# Patient Record
Sex: MALE | Race: WHITE | NOT HISPANIC OR LATINO | URBAN - METROPOLITAN AREA
[De-identification: names, ages, dates, MRNs, and addresses within clinical notes are randomized per-mention and may not be internally consistent; named-entity substitution may affect disease eponyms.]

---

## 2018-01-09 PROCEDURE — 99285 EMERGENCY DEPT VISIT HI MDM: CPT | Mod: 25

## 2018-01-10 ENCOUNTER — OUTPATIENT (OUTPATIENT)
Dept: OUTPATIENT SERVICES | Facility: HOSPITAL | Age: 58
LOS: 1 days | End: 2018-01-10

## 2018-01-10 ENCOUNTER — INPATIENT (INPATIENT)
Facility: HOSPITAL | Age: 58
LOS: 0 days | Discharge: HOME | End: 2018-01-10
Payer: COMMERCIAL

## 2018-05-04 ENCOUNTER — TRANSCRIPTION ENCOUNTER (OUTPATIENT)
Age: 58
End: 2018-05-04

## 2019-02-19 ENCOUNTER — EMERGENCY (EMERGENCY)
Facility: HOSPITAL | Age: 59
LOS: 1 days | End: 2019-02-19
Admitting: EMERGENCY MEDICINE
Payer: COMMERCIAL

## 2019-02-19 PROCEDURE — 93010 ELECTROCARDIOGRAM REPORT: CPT

## 2019-02-19 PROCEDURE — 99285 EMERGENCY DEPT VISIT HI MDM: CPT

## 2019-02-20 ENCOUNTER — RESULT REVIEW (OUTPATIENT)
Age: 59
End: 2019-02-20

## 2019-02-20 ENCOUNTER — INPATIENT (INPATIENT)
Facility: HOSPITAL | Age: 59
LOS: 0 days | Discharge: HOME | DRG: 392 | End: 2019-02-20
Attending: HOSPITALIST | Admitting: STUDENT IN AN ORGANIZED HEALTH CARE EDUCATION/TRAINING PROGRAM
Payer: COMMERCIAL

## 2019-02-20 ENCOUNTER — TRANSCRIPTION ENCOUNTER (OUTPATIENT)
Age: 59
End: 2019-02-20

## 2019-02-20 VITALS
RESPIRATION RATE: 18 BRPM | DIASTOLIC BLOOD PRESSURE: 74 MMHG | OXYGEN SATURATION: 99 % | HEART RATE: 70 BPM | SYSTOLIC BLOOD PRESSURE: 142 MMHG | TEMPERATURE: 98 F

## 2019-02-20 VITALS
WEIGHT: 197.09 LBS | HEIGHT: 69 IN | OXYGEN SATURATION: 98 % | TEMPERATURE: 98 F | DIASTOLIC BLOOD PRESSURE: 79 MMHG | SYSTOLIC BLOOD PRESSURE: 137 MMHG | HEART RATE: 74 BPM | RESPIRATION RATE: 20 BRPM

## 2019-02-20 DIAGNOSIS — T18.108A UNSPECIFIED FOREIGN BODY IN ESOPHAGUS CAUSING OTHER INJURY, INITIAL ENCOUNTER: ICD-10-CM

## 2019-02-20 PROBLEM — Z00.00 ENCOUNTER FOR PREVENTIVE HEALTH EXAMINATION: Status: ACTIVE | Noted: 2019-02-20

## 2019-02-20 LAB
ANION GAP SERPL CALC-SCNC: 9 MMOL/L — SIGNIFICANT CHANGE UP (ref 5–17)
APTT BLD: 27.8 SEC — SIGNIFICANT CHANGE UP (ref 27.5–36.3)
BLD GP AB SCN SERPL QL: SIGNIFICANT CHANGE UP
BUN SERPL-MCNC: 17 MG/DL — SIGNIFICANT CHANGE UP (ref 8–20)
CALCIUM SERPL-MCNC: 7.5 MG/DL — LOW (ref 8.6–10.2)
CHLORIDE SERPL-SCNC: 111 MMOL/L — HIGH (ref 98–107)
CO2 SERPL-SCNC: 21 MMOL/L — LOW (ref 22–29)
CREAT SERPL-MCNC: 0.56 MG/DL — SIGNIFICANT CHANGE UP (ref 0.5–1.3)
GLUCOSE SERPL-MCNC: 90 MG/DL — SIGNIFICANT CHANGE UP (ref 70–115)
HCT VFR BLD CALC: 39.1 % — LOW (ref 42–52)
HGB BLD-MCNC: 13.3 G/DL — LOW (ref 14–18)
INR BLD: 1.03 RATIO — SIGNIFICANT CHANGE UP (ref 0.88–1.16)
MCHC RBC-ENTMCNC: 31.4 PG — HIGH (ref 27–31)
MCHC RBC-ENTMCNC: 34 G/DL — SIGNIFICANT CHANGE UP (ref 32–36)
MCV RBC AUTO: 92.4 FL — SIGNIFICANT CHANGE UP (ref 80–94)
PLATELET # BLD AUTO: 285 K/UL — SIGNIFICANT CHANGE UP (ref 150–400)
POTASSIUM SERPL-MCNC: 4 MMOL/L — SIGNIFICANT CHANGE UP (ref 3.5–5.3)
POTASSIUM SERPL-SCNC: 4 MMOL/L — SIGNIFICANT CHANGE UP (ref 3.5–5.3)
PROTHROM AB SERPL-ACNC: 11.9 SEC — SIGNIFICANT CHANGE UP (ref 10–12.9)
RBC # BLD: 4.23 M/UL — LOW (ref 4.6–6.2)
RBC # FLD: 12.6 % — SIGNIFICANT CHANGE UP (ref 11–15.6)
SODIUM SERPL-SCNC: 141 MMOL/L — SIGNIFICANT CHANGE UP (ref 135–145)
TYPE + AB SCN PNL BLD: SIGNIFICANT CHANGE UP
WBC # BLD: 7.8 K/UL — SIGNIFICANT CHANGE UP (ref 4.8–10.8)
WBC # FLD AUTO: 7.8 K/UL — SIGNIFICANT CHANGE UP (ref 4.8–10.8)

## 2019-02-20 PROCEDURE — 99223 1ST HOSP IP/OBS HIGH 75: CPT

## 2019-02-20 PROCEDURE — 99285 EMERGENCY DEPT VISIT HI MDM: CPT | Mod: 25

## 2019-02-20 PROCEDURE — 85610 PROTHROMBIN TIME: CPT

## 2019-02-20 PROCEDURE — 85027 COMPLETE CBC AUTOMATED: CPT

## 2019-02-20 PROCEDURE — 99285 EMERGENCY DEPT VISIT HI MDM: CPT

## 2019-02-20 PROCEDURE — 99053 MED SERV 10PM-8AM 24 HR FAC: CPT

## 2019-02-20 PROCEDURE — 86850 RBC ANTIBODY SCREEN: CPT

## 2019-02-20 PROCEDURE — 80048 BASIC METABOLIC PNL TOTAL CA: CPT

## 2019-02-20 PROCEDURE — 86901 BLOOD TYPING SEROLOGIC RH(D): CPT

## 2019-02-20 PROCEDURE — 36415 COLL VENOUS BLD VENIPUNCTURE: CPT

## 2019-02-20 PROCEDURE — 88305 TISSUE EXAM BY PATHOLOGIST: CPT | Mod: 26

## 2019-02-20 PROCEDURE — 99223 1ST HOSP IP/OBS HIGH 75: CPT | Mod: 25

## 2019-02-20 PROCEDURE — 85730 THROMBOPLASTIN TIME PARTIAL: CPT

## 2019-02-20 PROCEDURE — C1726: CPT

## 2019-02-20 PROCEDURE — 88305 TISSUE EXAM BY PATHOLOGIST: CPT

## 2019-02-20 PROCEDURE — 12345: CPT | Mod: NC

## 2019-02-20 PROCEDURE — 86900 BLOOD TYPING SEROLOGIC ABO: CPT

## 2019-02-20 RX ORDER — PANTOPRAZOLE SODIUM 20 MG/1
1 TABLET, DELAYED RELEASE ORAL
Qty: 60 | Refills: 0 | OUTPATIENT
Start: 2019-02-20 | End: 2019-03-21

## 2019-02-20 RX ORDER — SODIUM CHLORIDE 9 MG/ML
1000 INJECTION INTRAMUSCULAR; INTRAVENOUS; SUBCUTANEOUS
Qty: 0 | Refills: 0 | Status: DISCONTINUED | OUTPATIENT
Start: 2019-02-20 | End: 2019-02-20

## 2019-02-20 RX ORDER — PANTOPRAZOLE SODIUM 20 MG/1
40 TABLET, DELAYED RELEASE ORAL DAILY
Qty: 0 | Refills: 0 | Status: DISCONTINUED | OUTPATIENT
Start: 2019-02-20 | End: 2019-02-20

## 2019-02-20 RX ADMIN — PANTOPRAZOLE SODIUM 40 MILLIGRAM(S): 20 TABLET, DELAYED RELEASE ORAL at 11:39

## 2019-02-20 NOTE — ED ADULT TRIAGE NOTE - CHIEF COMPLAINT QUOTE
Transfer from [place] for esophageal food impaction. received glucagon x2 and 1 liter NS, currently states he feels better and has been able to swallow secretions for the past 45 mins, no airway compromise at this time

## 2019-02-20 NOTE — ED PROVIDER NOTE - ENMT, MLM
Airway patent, Nasal mucosa clear. Mouth with normal mucosa. Throat has no vesicles, no oropharyngeal exudates and uvula is midline. No drooling or trismus.

## 2019-02-20 NOTE — CONSULT NOTE ADULT - SUBJECTIVE AND OBJECTIVE BOX
Patient is a 58y old  Male who presents with a chief complaint of Food impaction (20 Feb 2019 04:38)      HPI:  59 y/o male with no significant PMH was sent from [facility] to ED for food impaction. Patient said he went to [facility] because he had chest tightness and abdominal discomfort due to food impaction in his esophagus. Patient said he was eating chicken around 6pm yesterday and choked on his food. He was evaluated at [facility], given Glucagon x2 and IV NS and was transferred to [facility] for GI consultation and possible endoscopy. Patient reported similar episode last year for which he had to undergo EGD. He follows GI (Dr. Veras). He currently has no chest pain or abdominal discomfort; no nausea, vomiting, difficultly breathing, drooling, able to speak in full sentence. (20 Feb 2019 04:38). He states that he had a similar episode roughly one year ago and had an EGD @ [facility] then with removal of meat impaction. Never went for f/u EGD but states that he has chronic dysphagia for solids and usually cuts his meat up very well but did not do so last night. He had a negative screening colonoscopy done roughly 5 years ago and does admit to chronic reflux for which he takes no acid suppression medications. He feels as if the piece of chicken is still there but can swallow his own saliva.      REVIEW OF SYSTEMS:  Constitutional: No fever, weight loss or fatigue  ENMT:  No difficulty hearing, tinnitus, vertigo; No sinus or throat pain  Respiratory: No cough, wheezing, chills or hemoptysis  Cardiovascular: No chest pain, palpitations, dizziness or leg swelling  Gastrointestinal: As per HPI.  Skin: No itching, burning, rashes or lesions   Musculoskeletal: No joint pain or swelling; No muscle, back or extremity pain  Patient has no cardiopulmonary, peripheral vascular, musculoskeletal, dermatological, neurological, or psychological symptoms or complaints at this time.    PAST MEDICAL & SURGICAL HISTORY:  GERD (gastroesophageal reflux disease)  No significant past surgical history      FAMILY HISTORY:  No pertinent family history in first degree relatives      SOCIAL HISTORY:  Smoking Status: [ ] Current, [ ] Former, [ x] Never  Pack Years: N/A. No h/o ETOH or drug abuse.    MEDICATIONS: None at home  MEDICATIONS  (STANDING):    MEDICATIONS  (PRN):      Allergies    penicillin (Unknown)    Intolerances        Vital Signs Last 24 Hrs  T(C): 36.7 (20 Feb 2019 04:58), Max: 37 (20 Feb 2019 03:41)  T(F): 98.1 (20 Feb 2019 04:58), Max: 98.6 (20 Feb 2019 03:41)  HR: 62 (20 Feb 2019 05:30) (62 - 80)  BP: 114/67 (20 Feb 2019 05:30) (114/67 - 152/87)  BP(mean): --  RR: 18 (20 Feb 2019 04:58) (18 - 20)  SpO2: 98% (20 Feb 2019 04:58) (98% - 99%)        PHYSICAL EXAM:    General: Well developed; well nourished; in no acute distress  HEENT: MMM, conjunctiva pink and sclera anicteric.  Lungs: Clear bilaterally.  Cor: RRR S1, S2 only.  Gastrointestinal: Abdomen: Soft, non-tender non-distended; Normal bowel sounds; No rebound or guarding or HSM.  NAVEED: Not done. Not indicated in this patient presently.  Extremities: Normal range of motion, No clubbing, cyanosis or edema  Neurological: Alert and oriented x3  Skin: Warm and dry. No obvious rash      LABS:                        13.3   7.8   )-----------( 285      ( 20 Feb 2019 03:21 )             39.1     02-20    141  |  111<H>  |  17.0  ----------------------------<  90  4.0   |  21.0<L>  |  0.56    Ca    7.5<L>      20 Feb 2019 03:21            RADIOLOGY & ADDITIONAL STUDIES:   None to review.

## 2019-02-20 NOTE — H&P ADULT - HISTORY OF PRESENT ILLNESS
59 y/o male with no significant PMH was sent from [facility] to ED for food impaction. Patient said he went to [facility] because he had chest tightness and abdominal discomfort due to food impaction in his esophagus. Patient said he was eating chicken around 6pm yesterday and choked on his food. He was evaluated at [facility], given Glucagon x2 and IV NS and was transferred to [facility] for GI consultation and possible endoscopy. Patient reported similar episode last year for which he had to undergo EGD. He follows GI (Dr. Veras). He currently has no chest pain or abdominal discomfort; no nausea, vomiting, difficultly breathing, drooling, able to speak in full sentence.

## 2019-02-20 NOTE — ED ADULT NURSE NOTE - OBJECTIVE STATEMENT
pt alert and oriented x4 transferred to [facility] from [facility]. pt states he was eating chicken at 1900 and did not cut his chicken into his normal small pieces and felt the chicken get impacted in his throat. pt states this has happened before leading to an endoscopy for removal. respirations even unlabored. pt in no current distress. pt awaiting GI in AM. pt educated on plan of care, pt able to successfully teach back plan of care to RN, RN will continue to reeducate pt during hospital stay.

## 2019-02-20 NOTE — ED PROVIDER NOTE - OBJECTIVE STATEMENT
59 y/o M presents to ED c/o chest tightness and abdominal discomfort secondary to food bolus in the esophagus onset today. States he was eating chicken and immediately felt it get stuck in his throat. Was evaluated at [facility], where he was given Glucagon x2 and IV NS and was transferred to [facility] for GI consultation and possible endoscopy. Currently in the ED, states he feels he can now handle his secretions, the chest pain has subsided and generally feels better. Did not have an x-ray done at [place] because he refused. Notes this is not the first time he has had an impacted food bolus and followed up with his GI after, stating he was placed on medications to help manage his GERD and that "he expanded my throat somehow". Has not yet tried to eat or drink since being at [facility]. Denies fever. Denies HA or neck pain. No cough or sob. No abd pain or n/v/d. No urinary f/u/d. No back pain. No motor or sensory deficits. Denies illicit drug use. No recent travel. No rash. No other acute issues, symptoms or concerns.       GI: Dr. Veras

## 2019-02-20 NOTE — DISCHARGE NOTE ADULT - MEDICATION SUMMARY - MEDICATIONS TO TAKE
I will START or STAY ON the medications listed below when I get home from the hospital:    Protonix 40 mg oral delayed release tablet  -- 1 tab(s) by mouth 2 times a day   -- It is very important that you take or use this exactly as directed.  Do not skip doses or discontinue unless directed by your doctor.  Obtain medical advice before taking any non-prescription drugs as some may affect the action of this medication.  Swallow whole.  Do not crush.    -- Indication: For Esophagitis

## 2019-02-20 NOTE — H&P ADULT - ASSESSMENT
59 y/o male with no significant PMH was sent from [facility] to ED for food impaction.     Food impaction   Admit to monitor bed   S/p Glucagon at [facility]   GI consulted in the ED, will see patient in AM for EGD   Will keep patient NPO     Supportive   DVT prophylaxis: ambulate as tolerated   Diet: NPO; advance diet as tolerated after EGD

## 2019-02-20 NOTE — DISCHARGE NOTE ADULT - CARE PROVIDER_API CALL
YASH,   Phone: (   )    -  Fax: (   )    -  Follow Up Time:     Qamar James)  Gastroenterology; Internal Medicine  [address] [place] [address]  Phone: [phone]  Fax: [phone]  Follow Up Time:

## 2019-02-20 NOTE — ED PROVIDER NOTE - SKIN, MLM
Skin normal color for race, warm, dry and intact. No evidence of rash. No crepitus to chest wall or neck.

## 2019-02-20 NOTE — CONSULT NOTE ADULT - PROBLEM SELECTOR RECOMMENDATION 9
Pt. with meat impaction in esophagus. Keep NPO for EGD later this AM. Pt. is aware and agreeable. EGD with report to follow. If meat impaction successfully cleared he can be discharged home thereafter.

## 2019-02-20 NOTE — ED ADULT NURSE NOTE - NSFALLRSKOUTCOME_ED_ALL_ED
1) Start decadron burst by mouth twice daily x 5 days:  - 2.5mg (5 tabs) in the morning with food  - 2.25mg (4.5 tabs) in the evening with food    2) Continue 6MP (mercaptopurine) 1.5 tabs (75mg) by mouth every day    3) Continue methotrexate 8 tabs (20mg) by mouth once a week on the following Thursdays:  - 1/4/18  - 1/11/18  - 1/18/18  - 1/25/18    4) Return to clinic on 1/9/18 at 12pm to participate in [facility]   Universal Safety Interventions

## 2019-02-20 NOTE — DISCHARGE NOTE ADULT - PATIENT PORTAL LINK FT
You can access the [email or web address] Patient Portal, offered by [facility], by registering with the following website: [email or web address]

## 2019-02-20 NOTE — DISCHARGE NOTE ADULT - PLAN OF CARE
Dilated. Status post endoscopy, dilated. Biopsy obtained.  F/u with GI 1-2 weeks to follow on biopsy result Take medicine as reconciled.   F/u with GI as above.

## 2019-02-20 NOTE — BRIEF OPERATIVE NOTE - OPERATION/FINDINGS
erosive esophahgitis LA grade B  esophageal stricture @ 40 dilated to 11->12->13.5-> 15   stricture biopsied  otherwise nl EGD

## 2019-02-20 NOTE — DISCHARGE NOTE ADULT - CARE PLAN
Principal Discharge DX:	Esophageal stricture  Goal:	Dilated.  Assessment and plan of treatment:	Status post endoscopy, dilated. Biopsy obtained.  F/u with GI 1-2 weeks to follow on biopsy result  Secondary Diagnosis:	Esophagitis  Assessment and plan of treatment:	Take medicine as reconciled.   F/u with GI as above.

## 2019-02-20 NOTE — ED ADULT NURSE NOTE - CHIEF COMPLAINT QUOTE
Transfer from [place] for esophageal food impaction. received glucagon x2 and 1 liter NS, currently states he feels better and has been able to swallow secretions for the past 45 mins, no airway compromise at this time

## 2019-02-20 NOTE — DISCHARGE NOTE ADULT - HOSPITAL COURSE
Pt is 57 y/o male with no significant PMH, not on any medicine at home, was sent to [facility] from [facility] for food impaction after eating chicken. Pt was admitted to medicine in consultation with GI, underwent EGD which showed esophagitis and esophageal stricture, s/p dilatation. Pt clinically improved post EGD and is completely asymptomatic.  Deemed cleared from GI on PPI and outpatient f/u on biopsy result.    PHYSICAL EXAM:    Vital Signs Last 24 Hrs  T(C): 36.4 (20 Feb 2019 11:27), Max: 37 (20 Feb 2019 03:41)  T(F): 97.6 (20 Feb 2019 11:27), Max: 98.6 (20 Feb 2019 03:41)  HR: 70 (20 Feb 2019 11:27) (62 - 80)  BP: 142/74 (20 Feb 2019 11:27) (113/56 - 152/87)  BP(mean): --  RR: 18 (20 Feb 2019 11:27) (18 - 20)  SpO2: 99% (20 Feb 2019 11:27) (98% - 100%)    GENERAL: Pt lying comfortably, NAD.  CHEST/LUNG: Clear to auscultatation bilaterally; No wheezing.  HEART: S1S2+, Regular rate and rhythm; No murmurs.  ABDOMEN: Soft, Nontender, Nondistended; Bowel sounds present.  NEURO: AAOX3, no focal deficits, no motor r sensory loss.  PSYCH: normal mood.

## 2019-02-22 LAB — SURGICAL PATHOLOGY STUDY: SIGNIFICANT CHANGE UP

## 2020-12-31 ENCOUNTER — EMERGENCY (EMERGENCY)
Facility: HOSPITAL | Age: 60
LOS: 1 days | End: 2020-12-31
Admitting: EMERGENCY MEDICINE
Payer: COMMERCIAL

## 2020-12-31 PROCEDURE — 99284 EMERGENCY DEPT VISIT MOD MDM: CPT

## 2020-12-31 PROCEDURE — 93971 EXTREMITY STUDY: CPT | Mod: 26,LT

## 2021-01-05 PROBLEM — K21.9 GASTRO-ESOPHAGEAL REFLUX DISEASE WITHOUT ESOPHAGITIS: Chronic | Status: ACTIVE | Noted: 2019-02-20

## 2021-01-06 ENCOUNTER — APPOINTMENT (OUTPATIENT)
Dept: RADIOLOGY | Facility: CLINIC | Age: 61
End: 2021-01-06
Payer: COMMERCIAL

## 2021-01-06 PROCEDURE — 73560 X-RAY EXAM OF KNEE 1 OR 2: CPT | Mod: LT

## 2021-01-06 PROCEDURE — 73600 X-RAY EXAM OF ANKLE: CPT | Mod: RT

## 2021-09-05 DIAGNOSIS — Z01.818 ENCOUNTER FOR OTHER PREPROCEDURAL EXAMINATION: ICD-10-CM

## 2021-09-06 ENCOUNTER — APPOINTMENT (OUTPATIENT)
Dept: DISASTER EMERGENCY | Facility: CLINIC | Age: 61
End: 2021-09-06

## 2021-09-07 LAB — SARS-COV-2 N GENE NPH QL NAA+PROBE: NOT DETECTED

## 2021-09-09 ENCOUNTER — OUTPATIENT (OUTPATIENT)
Dept: OUTPATIENT SERVICES | Facility: HOSPITAL | Age: 61
LOS: 1 days | End: 2021-09-09

## 2022-09-29 NOTE — PATIENT PROFILE ADULT - BRADEN FRICTION AND SHEAR
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 Plan: Discussed condition w/pt. Pt feels daily tasks are more difficult to do without opening eyes widely, or lifting eyebrows with fingers. Refer for consult w/oculoplastic specialist, next available. (3) no apparent problem

## 2024-02-22 ENCOUNTER — APPOINTMENT (OUTPATIENT)
Dept: RADIOLOGY | Facility: CLINIC | Age: 64
End: 2024-02-22
Payer: COMMERCIAL

## 2024-02-22 PROCEDURE — 73610 X-RAY EXAM OF ANKLE: CPT | Mod: RT

## 2024-05-16 NOTE — H&P ADULT - RS GEN PE MLT RESP DETAILS PC
[facility] Infusion Note     Matthew Candelario is a 47 y.o. year old male here today,05/16/24,  in the [facility] infusion center for the following regimen:     Treatment Plans       Name Type Plan Dates Plan Provider         Active    (INPT) DVd (Daratumumab + Bortezomib / Dexamethasone), 28 Day [facility] Treatment  4/11/2024 - Present Vince Child MD PhD                      Overall, pt states moderate fatigue, no concerns regarding appetite, and 7/10 chronic back pain.     Pt CBC was drawn by RN with a butterfly needle and then removed upon completion of the lab draw.       Administrations This Visit       acetaminophen (Tylenol) tablet 650 mg       Admin Date  05/16/2024 Action  Given Dose  650 mg Route  oral Documented By  Jill Narayan RN              bortezomib (Velcade) subQ syringe 3.125 mg       Admin Date  05/16/2024 Action  Given Dose  3.125 mg Route  subcutaneous Documented By  Jill Narayan RN              daratumumab-hyaluronidase (Darzalex Faspro) 1,800 mg-30,000 units/15 mL subQ syringe       Admin Date  05/16/2024 Action  Given Dose  1,800 mg Route  subcutaneous Documented By  Jill Narayan RN              dexAMETHasone (Decadron) tablet 40 mg       Admin Date  05/16/2024 Action  Given Dose  40 mg Route  oral Documented By  Jill Narayan RN              diphenhydrAMINE (BENADryl) capsule 50 mg       Admin Date  05/16/2024 Action  Given Dose  50 mg Route  oral Documented By  Jill Narayan RN              montelukast (Singulair) tablet 10 mg       Admin Date  05/16/2024 Action  Given Dose  10 mg Route  oral Documented By  Jill Narayan RN                    Pt tolerated medications above well and was discharged to home  via round trip transportation service in stable condition.  RN transported pt via wheelchair to the reception area where he waited for the transportation service to arrive. Pt had no further questions or concerns at this time.      '  
respirations non-labored/clear to auscultation bilaterally/no wheezes